# Patient Record
(demographics unavailable — no encounter records)

---
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
## 2024-10-07 NOTE — HISTORY OF PRESENT ILLNESS
[FreeTextEntry1] : 1. Sinusitis, Rhinitis, - Symptoms include nasal congestion  - Currently using Fluticasone nasal spray PRN and nasal saline spray  -Pt reports feeling generally well / some relief -has enough medication for now   2. GERD: -currently using daily  Prevacid  -Pt reports feeling generally well controlled on this, he reports when he tried weaning off of the medication, he would get episodes of chest tightness and tight feeling in his throat; he reports to have been evaluated for this has some scans at home.  - colonoscopy   EGD 2022 -denies nocturnal symptoms, denies dysphagia, -follows with his own GI cannot recall name    3. ELVI: - Sleep study date : 2018 mild however reports his symptoms have become worse snoring and stops breathing at night  -reports he has an episode in the past where he almost passed out while coming off the machine -He would like to seek alternative therapies -He reports he only sleeps 3 hours at night

## 2024-10-07 NOTE — REASON FOR VISIT
[Follow-Up] : a follow-up visit [FreeTextEntry1] : chronic rhinitis and GERD certified by [employer] as WTC  related

## 2024-10-07 NOTE — HEALTH RISK ASSESSMENT
[Patient reported colonoscopy was normal] : Patient reported colonoscopy was normal [ColonoscopyDate] : 2022 [ColonoscopyComments] : 5 years as per patient

## 2024-10-07 NOTE — PHYSICAL EXAM
[General Appearance - Alert] : alert [General Appearance - In No Acute Distress] : in no acute distress [Neck Appearance] : the appearance of the neck was normal [] : no respiratory distress [Respiration, Rhythm And Depth] : normal respiratory rhythm and effort [Auscultation Breath Sounds / Voice Sounds] : lungs were clear to auscultation bilaterally [Apical Impulse] : the apical impulse was normal [Heart Rate And Rhythm] : heart rate was normal and rhythm regular [Heart Sounds] : normal S1 and S2 [Edema] : there was no peripheral edema [Bowel Sounds] : normal bowel sounds [Abdomen Soft] : soft [Abdomen Tenderness] : non-tender [Abnormal Walk] : normal gait [Oriented To Time, Place, And Person] : oriented to person, place, and time [Skin Color & Pigmentation] : normal skin color and pigmentation
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
## 2024-10-07 NOTE — ASSESSMENT
[FreeTextEntry1] : 1. Chronic Rhinitis   cont Flonase nasal spray  and Allegra  has enough medication  will call if needs ENT referral    2.GERD-  cont Prevacid  GERD control with diet, weight loss and life style changes discussed  will find out if his GI is in our network  does not need any refills   ELVI-   -pt was prescribed CPAP machine but had an incident that he could not breath and had mucus in the throat that could not cough it up  -he is reluctant to try again  -sleep specialist referral made for possible CPAP titration  -reviewed risks of untreated sleep apnea, -reviewed role of weight loss in management. -pt aware to avoid drowsy driving
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
## 2024-10-07 NOTE — REVIEW OF SYSTEMS
[see HPI] : see HPI [Heartburn] : heartburn [Fever] : no fever [Chills] : no chills [Eye Pain] : no eye pain [Red Eyes] : eyes not red [Earache] : no earache [Loss Of Hearing] : no hearing loss [Chest Pain] : no chest pain [Palpitations] : no palpitations [Shortness Of Breath] : no shortness of breath [Cough] : no cough [Wheezing] : no wheezing [SOB on Exertion] : no shortness of breath during exertion [Abdominal Pain] : no abdominal pain [Constipation] : no constipation [Joint Pain] : no joint pain
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
